# Patient Record
Sex: MALE | Race: WHITE | Employment: FULL TIME | URBAN - NONMETROPOLITAN AREA
[De-identification: names, ages, dates, MRNs, and addresses within clinical notes are randomized per-mention and may not be internally consistent; named-entity substitution may affect disease eponyms.]

---

## 2017-01-01 ENCOUNTER — HOSPITAL ENCOUNTER (OUTPATIENT)
Dept: PHYSICAL THERAPY | Age: 37
Setting detail: THERAPIES SERIES
Discharge: HOME | End: 2017-12-21
Payer: COMMERCIAL

## 2017-01-01 ENCOUNTER — HOSPITAL ENCOUNTER (OUTPATIENT)
Dept: PHYSICAL THERAPY | Age: 37
Setting detail: THERAPIES SERIES
End: 2017-01-01
Payer: COMMERCIAL

## 2017-01-01 ENCOUNTER — HOSPITAL ENCOUNTER (OUTPATIENT)
Dept: PHYSICAL THERAPY | Age: 37
Setting detail: THERAPIES SERIES
Discharge: HOME | End: 2017-12-12
Payer: COMMERCIAL

## 2017-01-01 ENCOUNTER — HOSPITAL ENCOUNTER (OUTPATIENT)
Dept: PHYSICAL THERAPY | Age: 37
Setting detail: THERAPIES SERIES
Discharge: HOME | End: 2017-12-27
Payer: COMMERCIAL

## 2017-01-01 ENCOUNTER — HOSPITAL ENCOUNTER (OUTPATIENT)
Dept: PHYSICAL THERAPY | Age: 37
Setting detail: THERAPIES SERIES
Discharge: HOME | End: 2017-12-08
Payer: COMMERCIAL

## 2017-01-01 PROCEDURE — 97140 MANUAL THERAPY 1/> REGIONS: CPT

## 2017-01-01 ASSESSMENT — PAIN SCALES - GENERAL: PAINLEVEL_OUTOF10: 3

## 2017-01-01 ASSESSMENT — PAIN DESCRIPTION - PAIN TYPE: TYPE: CHRONIC PAIN

## 2017-01-01 ASSESSMENT — PAIN DESCRIPTION - ORIENTATION: ORIENTATION: LEFT;OUTER

## 2017-01-01 ASSESSMENT — PAIN DESCRIPTION - DESCRIPTORS: DESCRIPTORS: THROBBING;ACHING

## 2017-01-01 ASSESSMENT — PAIN DESCRIPTION - LOCATION: LOCATION: NECK

## 2017-03-24 ENCOUNTER — OFFICE VISIT (OUTPATIENT)
Dept: FAMILY MEDICINE CLINIC | Age: 37
End: 2017-03-24

## 2017-03-24 VITALS
SYSTOLIC BLOOD PRESSURE: 164 MMHG | BODY MASS INDEX: 39.47 KG/M2 | TEMPERATURE: 99 F | DIASTOLIC BLOOD PRESSURE: 112 MMHG | HEART RATE: 104 BPM | HEIGHT: 74 IN | WEIGHT: 307.6 LBS

## 2017-03-24 DIAGNOSIS — K04.7 DENTAL INFECTION: Primary | ICD-10-CM

## 2017-03-24 PROCEDURE — 99212 OFFICE O/P EST SF 10 MIN: CPT | Performed by: FAMILY MEDICINE

## 2017-03-24 RX ORDER — COVID-19 ANTIGEN TEST
1 KIT MISCELLANEOUS 2 TIMES DAILY PRN
COMMUNITY

## 2017-03-24 RX ORDER — PENICILLIN V POTASSIUM 500 MG/1
500 TABLET ORAL 3 TIMES DAILY
Qty: 60 TABLET | Refills: 0 | Status: SHIPPED | OUTPATIENT
Start: 2017-03-24 | End: 2017-04-13

## 2017-07-07 ENCOUNTER — OFFICE VISIT (OUTPATIENT)
Dept: FAMILY MEDICINE CLINIC | Age: 37
End: 2017-07-07

## 2017-07-07 VITALS
WEIGHT: 264 LBS | BODY MASS INDEX: 33.88 KG/M2 | HEART RATE: 64 BPM | DIASTOLIC BLOOD PRESSURE: 72 MMHG | SYSTOLIC BLOOD PRESSURE: 110 MMHG | HEIGHT: 74 IN

## 2017-07-07 DIAGNOSIS — I10 ESSENTIAL HYPERTENSION: Primary | ICD-10-CM

## 2017-07-07 DIAGNOSIS — I10 ESSENTIAL HYPERTENSION: ICD-10-CM

## 2017-07-07 PROCEDURE — 99213 OFFICE O/P EST LOW 20 MIN: CPT | Performed by: FAMILY MEDICINE

## 2017-07-07 RX ORDER — OXYCODONE HYDROCHLORIDE AND ACETAMINOPHEN 325; 5 MG/5ML; MG/5ML
SOLUTION ORAL EVERY 4 HOURS PRN
COMMUNITY
End: 2018-01-01

## 2017-07-07 RX ORDER — METOPROLOL SUCCINATE 25 MG/1
25 TABLET, EXTENDED RELEASE ORAL 2 TIMES DAILY
Qty: 180 TABLET | Refills: 1 | Status: SHIPPED | OUTPATIENT
Start: 2017-07-07 | End: 2017-07-07

## 2017-07-07 RX ORDER — AMLODIPINE BESYLATE 10 MG/1
10 TABLET ORAL DAILY
COMMUNITY
End: 2017-07-07 | Stop reason: SDUPTHER

## 2017-07-07 RX ORDER — AMLODIPINE BESYLATE 10 MG/1
10 TABLET ORAL DAILY
Qty: 90 TABLET | Refills: 1 | Status: SHIPPED | OUTPATIENT
Start: 2017-07-07 | End: 2018-01-01 | Stop reason: SDUPTHER

## 2017-07-07 RX ORDER — METOPROLOL SUCCINATE 25 MG/1
25 TABLET, EXTENDED RELEASE ORAL 2 TIMES DAILY
COMMUNITY
End: 2017-07-07 | Stop reason: SDUPTHER

## 2017-07-07 RX ORDER — AMLODIPINE BESYLATE 10 MG/1
10 TABLET ORAL DAILY
Qty: 90 TABLET | Refills: 1 | Status: SHIPPED | OUTPATIENT
Start: 2017-07-07 | End: 2017-07-07 | Stop reason: SDUPTHER

## 2017-07-07 ASSESSMENT — ENCOUNTER SYMPTOMS
CHEST TIGHTNESS: 0
TROUBLE SWALLOWING: 1
SHORTNESS OF BREATH: 0

## 2017-10-30 ENCOUNTER — HOSPITAL ENCOUNTER (OUTPATIENT)
Dept: PHYSICAL THERAPY | Age: 37
Setting detail: THERAPIES SERIES
Discharge: HOME | End: 2017-10-30
Payer: COMMERCIAL

## 2017-10-30 PROCEDURE — 97140 MANUAL THERAPY 1/> REGIONS: CPT

## 2017-10-30 ASSESSMENT — PAIN DESCRIPTION - LOCATION: LOCATION: NECK

## 2017-10-30 ASSESSMENT — PAIN DESCRIPTION - DESCRIPTORS: DESCRIPTORS: TIGHTNESS

## 2017-10-30 ASSESSMENT — PAIN DESCRIPTION - PAIN TYPE: TYPE: CHRONIC PAIN

## 2017-10-30 ASSESSMENT — PAIN SCALES - GENERAL: PAINLEVEL_OUTOF10: 4

## 2017-10-30 NOTE — PROGRESS NOTES
Katarina Hidalgo 61  LYMPHEDEMA SERVICES  DAILY NOTE    Date: 10/30/2017  Patient Name: Leroy Harris        CSN: [id number]   : 1980  (29 y.o.)  Gender: male   Referring Practitioner: Vilma Daily MD ([address] [facility],)  Diagnosis: 1) Squamous Cell Cancer of Tongue  2) Lymphedema  Referral Date : 17    PT Visit Information  PT Insurance Information: [facility] ([address] [place] [address])  Total # of Visits to Date: 2  Plan of Care/Certification Expiration Date: 18  No Show: 0  Canceled Appointment: 0  Progress Note Counter: 2/10    Restrictions/Precautions  Fall risk, Universal precautions,Trach and PEG tube in place. Pain  Patient Currently in Pain: Yes (Patient denied pain in the face/neck region.)  Pain Assessment  Pain Assessment: 0-10  Pain Level: 4  Pain Type: Chronic pain  Pain Location: Neck  Pain Descriptors: Tightness    SUBJECTIVE     -10/30/2017: The patient presented to the Lymphedema clinic on this date for his initial treatment session. He denied any changes in his medical status since the initial evaluation. TREATMENT SESSION  Manual Lymph Drainage (Purpose: Increase Lymph Circulation/Decongestion of head and neck area). -POSITION: Supine with head of treatment table slightly elevated with knees supported in slightly flexed position on bolster. -FREQUENCY: 5-7 circles completed unless otherwise stated.   -SEQUENCING FOR NECK (POST-SURGICAL SWELLING)   -Effleurage (2-3x): From Sternum to Acromion (Bilaterally)   -\"Terminus\"-Supraclavicular Fossa (Manipulation of the Inferior Cervical Lymph Nodes (Stationary circles))   -\"Profundus\"-Manipulation of the Deep Lateral Cervical Lymph Nodes (Earlobe to Supraclavicular Fossa)   -Pre-Auricular LNN and Retro-Auricular LNN- (Stationary circles)   -Submandibular Lymph Nodes-Manipulation of the Superior Cervical Lymph Nodes (Chin to jaw angle)   -Shoulder Collectors (2 hand placement on the Acromion and Medial Shoulder)     -SEQUENCING FOR FACE (POST-SURGICAL SWELLING)   -Effleurage (2-3x): Along lower jaw, upper jaw, cheek, and the forehead in direction of the angle of the jaw. -Upper Jaw (Submandibular Lymph Nodes to the angle of the jaw)   -Bridge of nose (Bridge of nose to cheek)   -Cheek (Below Eyelids towards Submandibular Lymph Nodes then towards angle of Supraclavicular Fossa)   -Upper Eyelids and eyebrows (Working towards Preauricular Lymph Nodes)   -Forehead (Forehead and temple towards Preauricular Lymph Nodes)   -Top of head (Towards Preauricular Lymph Nodes)   -Effleurage  -REWORK    Skin Care Measures  Applied moisturizer - Type: Eucerin Moisturizing creme on lateral trap muscles (bilaterally). Compression Bandaging-No compression bandaging applied 10/30/2017     Decongestive/Therapeutic Exercise  The patient was able to complete Decongestive Therapy exercises (x 10 reps) including head/neck rotation R/L; fist clenches (x 10 reps); shoulder flexion Bilaterally with slight pressure applied on the left for stretching. Patient reported stretching/pulling sensation in the left upper arm region. The exercises were completed with compression bandages on, without complaints of pain from the patient. The Decongestive Therapy exercises assist with decreasing the swelling by increasing the muscle pumping action of the lymph collectors and by increasing the fluid uptake in the lymphatic system. Patient Education  New Education Provided:   -10/30/2017: Refer to Decongestive/Therapeutic Exercise; Proper deep breathing technique.   Learner:patient  Method: demonstration and explanation       Outcome: acknowledged understanding of goals, exercises, demonstrated understanding and asked questions     GOALS   SHORT-TERM GOALS:  to be met in 6 weeks   X  Patient will demonstrate a decrease in circumferential measurements of the affected extremity by 3-5 cm working towards the lymphedema swelling stabilizing and patient being able to get measured and fitted for a new compression garment to be worn daily to keep swelling down. X  Patient will demonstrate increased active range of motion by at least 10 degrees for head/neck working to increase safety during ambulation and future driving skills for looking at blind spot of the shoulder. X Patient/family/caregiver will demonstrate and verbalize 3-5 skin care precautionary measures to decrease dry skin and risk of infection. X Patient will demonstrate increased active shoulder/elbow/wrist range of motion by at least 20 degrees (left side) to be able to return to work and complete his daily duties safely. -- --      ASSESSMENT:  Assessment:  Patient progressing toward goal achievement. Patient may benefit from sequential compression pump for the trunk, neck and head to be used during home management skills. Activity Tolerance:  Patient tolerance of  treatment: Fair (Noted facial grimacing during MLD on the left side of the neck, decreased amount of pressure applied.)  Plan: Continue treatment according to established plan of care.          Time In: 1530   Time Out: 1625   Timed Code Minutes: 55   Untimed Code Minutes: 0   Total Treatment Time: [address], P.T. #6192, [facility]    10/30/2017

## 2017-11-02 ENCOUNTER — HOSPITAL ENCOUNTER (OUTPATIENT)
Dept: PHYSICAL THERAPY | Age: 37
Setting detail: THERAPIES SERIES
Discharge: HOME | End: 2017-11-02
Payer: COMMERCIAL

## 2017-11-02 PROCEDURE — 97140 MANUAL THERAPY 1/> REGIONS: CPT

## 2017-11-02 NOTE — PROGRESS NOTES
LNN and Retro-Auricular LNN- (Stationary circles)   -Submandibular Lymph Nodes-Manipulation of the Superior Cervical Lymph Nodes (Chin to jaw angle)   -Shoulder Collectors (2 hand placement on the Acromion and Medial Shoulder)     -SEQUENCING FOR FACE (POST-SURGICAL SWELLING)   -Effleurage (2-3x): Along lower jaw, upper jaw, cheek, and the forehead in direction of the angle of the jaw. -Upper Jaw (Submandibular Lymph Nodes to the angle of the jaw)   -Bridge of nose (Bridge of nose to cheek)   -Cheek (Below Eyelids towards Submandibular Lymph Nodes then towards angle of Supraclavicular Fossa)   -Upper Eyelids and eyebrows (Working towards Preauricular Lymph Nodes)   -Forehead (Forehead and temple towards Preauricular Lymph Nodes)   -Top of head (Towards Preauricular Lymph Nodes)   -Effleurage  -REWORK  Fibrotic technique: Posterior neck and upper trapezius region. Skin Care Measures  Applied moisturizer - Type: Eucerin Moisturizing creme on lateral trap muscles (bilaterally). Compression Bandaging-No compression bandaging applied 11/2/2017     Decongestive/Therapeutic Exercise  The patient was able to complete Decongestive Therapy exercises (x 10 reps) including head/neck rotation R/L; fist clenches (x 10 reps); shoulder flexion Bilaterally with slight pressure applied on the left for stretching; (Seated) shoulder scapular retraction with 5 second holds with and without dowel jenna, shoulder flexion with limitations due to \"tight\" sensation and discomfort in the left upper extremity per patient; shoulder shrugs up with backwards rotation; and proper body mechanics with increased trunk extension with placement of lumbar roll in mid/lower back (with the trach and PEG tube placement, the patient tends to sit with a forward flexed position). Patient reported stretching/pulling sensation in the left upper arm region.  The exercises were completed with compression bandages on, without complaints of pain from the

## 2017-11-07 ENCOUNTER — HOSPITAL ENCOUNTER (OUTPATIENT)
Dept: PHYSICAL THERAPY | Age: 37
Setting detail: THERAPIES SERIES
Discharge: HOME | End: 2017-11-07
Payer: COMMERCIAL

## 2017-11-07 PROCEDURE — 97140 MANUAL THERAPY 1/> REGIONS: CPT

## 2017-11-07 NOTE — PROGRESS NOTES
Katarina Hidalgo 61  LYMPHEDEMA SERVICES  DAILY NOTE    Date: 2017  Patient Name: Nancy Angelucci        CSN: [id number]   : 1980  (29 y.o.)  Gender: male   Referring Practitioner: Gibson Meckel, MD ([address] [facility] [address],)  Diagnosis: 1) Squamous Cell Cancer of Tongue  2) Lymphedema  Referral Date : 17    PT Visit Information  PT Insurance Information: [facility] ([address] [place] [address])  Total # of Visits to Date: 4  Plan of Care/Certification Expiration Date: 18  No Show: 0  Canceled Appointment: 0  Progress Note Counter: 4/10    Restrictions/Precautions  Fall risk, Universal precautions,Trach and PEG tube in place. Pain  Patient Currently in Pain: Denies (Patient denied pain in the face/neck region, he reported numbness on the lateral aspect of the chin/neck region.)    SUBJECTIVE     -2017: The patient presented to the Lymphedema clinic on this date with no new complaints since the last treatment session. He reported that he feels as if the swelling has gone down in his face and neck region, \"I've noticed that my speech is a little better\" per patient. The therapist agreed and noted that the patient's speech was more clear less garbled. -2017: The patient presented to the Lymphedema clinic on this date with reports of numbness on the jaw/chin/neck region (left side). \"It's been like this since the surgery, so it's nothing new. \" per patient. He denied pain, however, the therapist noted facial grimacing during MLD on the left side of the lower face and neck.   -10/30/2017: The patient presented to the Lymphedema clinic on this date for his initial treatment session. He denied any changes in his medical status since the initial evaluation. TREATMENT SESSION  Manual Lymph Drainage (Purpose: Increase Lymph Circulation/Decongestion of head and neck area).   -POSITION: Supine with head of treatment table slightly elevated with knees supported in slightly flexed position on bolster. -FREQUENCY: 5-7 circles completed unless otherwise stated. -SEQUENCING FOR NECK (POST-SURGICAL SWELLING)   -Effleurage (2-3x): From Sternum to Acromion (Bilaterally)   -\"Terminus\"-Supraclavicular Fossa (Manipulation of the Inferior Cervical Lymph Nodes (Stationary circles))   -\"Profundus\"-Manipulation of the Deep Lateral Cervical Lymph Nodes (Earlobe to Supraclavicular Fossa)   -Pre-Auricular LNN and Retro-Auricular LNN- (Stationary circles)   -Submandibular Lymph Nodes-Manipulation of the Superior Cervical Lymph Nodes (Chin to jaw angle)   -Shoulder Collectors (2 hand placement on the Acromion and Medial Shoulder)     -SEQUENCING FOR FACE (POST-SURGICAL SWELLING)   -Effleurage (2-3x): Along lower jaw, upper jaw, cheek, and the forehead in direction of the angle of the jaw. -Upper Jaw (Submandibular Lymph Nodes to the angle of the jaw)   -Bridge of nose (Bridge of nose to cheek)   -Cheek (Below Eyelids towards Submandibular Lymph Nodes then towards angle of Supraclavicular Fossa)   -Upper Eyelids and eyebrows (Working towards Preauricular Lymph Nodes)   -Forehead (Forehead and temple towards Preauricular Lymph Nodes)   -Top of head (Towards Preauricular Lymph Nodes)   -Effleurage  -REWORK  Fibrotic technique: Posterior neck and upper trapezius region (Seated position with bilateral upper extremities supported on tray table with pillow and elevated slightly to shoulder height. Skin Care Measures  Applied moisturizer - Type: Eucerin Moisturizing creme on lateral trap muscles (bilaterally). Compression Bandaging-No compression bandaging applied 11/7/2017. Measurements taken for fabrication of neck/chin padding. Decongestive/Therapeutic Exercise  The patient was able to complete Decongestive Therapy exercises (x 10 reps) including facial exercises (Exaggerated vowels for neck stretching) x 5 reps.  The exercises were completed with compression bandages on, without achievement. Patient may benefit from sequential compression pump for the trunk, neck and head to be used during home management skills. The patient may also benefit from additional therapy to include Occupational Therapy to assist with left upper extremity range of motion and strengthening. Activity Tolerance:  Patient tolerance of  treatment: Fair (Noted facial grimacing during MLD on the left side of the neck, decreased amount of pressure applied. However, the patient denied having pain during the MLD and Fibrotic Technique.)  Plan: Week of November 6 (3x).          Time In: 1510   Time Out: 1615   Timed Code Minutes: 65   Untimed Code Minutes: 0   Total Treatment Time: [address], P[address] #1509, [facility]    11/7/2017

## 2017-11-09 ENCOUNTER — HOSPITAL ENCOUNTER (OUTPATIENT)
Dept: PHYSICAL THERAPY | Age: 37
Setting detail: THERAPIES SERIES
Discharge: HOME | End: 2017-11-09
Payer: COMMERCIAL

## 2017-11-09 PROCEDURE — 97140 MANUAL THERAPY 1/> REGIONS: CPT

## 2017-11-09 NOTE — PROGRESS NOTES
Katarina Hidalgo 61  LYMPHEDEMA SERVICES  DAILY NOTE    Date: 2017  Patient Name: Radha Arvizu        CSN: [id number]   : 1980  (29 y.o.)  Gender: male   Referring Practitioner: Samuel Sanches MD ([address],)  Diagnosis: 1) Squamous Cell Cancer of Tongue  2) Lymphedema  Referral Date : 17    PT Visit Information  PT Insurance Information: [facility] ([address] [place] [address])  Total # of Visits to Date: 5  Plan of Care/Certification Expiration Date: 18  No Show: 0  Canceled Appointment: 0  Progress Note Counter: 5/10    Restrictions/Precautions  Fall risk, Universal precautions,Trach and PEG tube in place. Pain  Patient Currently in Pain: Denies (Patient denied pain in the face/neck region, he reported numbness on the lateral aspect of the chin/neck region.)    SUBJECTIVE     -2017: The patient presented to the Lymphedema clinic no this date with no new complaints. Upon observation noted a slight increase in swelling in the neck region (right > left) in comparison to the last treatment session. -2017: The patient presented to the Lymphedema clinic on this date with no new complaints since the last treatment session. He reported that he feels as if the swelling has gone down in his face and neck region, \"I've noticed that my speech is a little better\" per patient. The therapist agreed and noted that the patient's speech was more clear less garbled. -2017: The patient presented to the Lymphedema clinic on this date with reports of numbness on the jaw/chin/neck region (left side). \"It's been like this since the surgery, so it's nothing new. \" per patient. He denied pain, however, the therapist noted facial grimacing during MLD on the left side of the lower face and neck.   -10/30/2017: The patient presented to the Lymphedema clinic on this date for his initial treatment session.  He denied any changes in his medical status since for head/neck working to increase safety during ambulation and future driving skills for looking at blind spot of the shoulder. X Patient/family/caregiver will demonstrate and verbalize 3-5 skin care precautionary measures to decrease dry skin and risk of infection. X Patient will demonstrate increased active shoulder/elbow/wrist range of motion by at least 20 degrees (left side) to be able to return to work and complete his daily duties safely. -- --      ASSESSMENT:  Assessment:  Patient progressing toward goal achievement. Patient may benefit from sequential compression pump for the trunk, neck and head to be used during home management skills. The patient may also benefit from additional therapy to include Occupational Therapy to assist with left upper extremity range of motion and strengthening. Activity Tolerance:  Patient tolerance of  treatment: Fair (Noted facial grimacing during MLD on the left side of the neck, decreased amount of pressure applied. However, the patient denied having pain during the MLD and Fibrotic Technique.)  Plan: Week of November 6 (3x).          Time In: 1500   Time Out: 1606   Timed Code Minutes: 66   Untimed Code Minutes: 0   Total Treatment Time: [address] [facility], P.T. #8803, [facility]    11/9/2017

## 2017-11-10 ENCOUNTER — HOSPITAL ENCOUNTER (OUTPATIENT)
Dept: PHYSICAL THERAPY | Age: 37
Setting detail: THERAPIES SERIES
Discharge: HOME | End: 2017-11-10
Payer: COMMERCIAL

## 2017-11-10 PROCEDURE — 97140 MANUAL THERAPY 1/> REGIONS: CPT

## 2017-11-12 ASSESSMENT — PAIN SCALES - GENERAL: PAINLEVEL_OUTOF10: 4

## 2017-11-12 ASSESSMENT — PAIN DESCRIPTION - ORIENTATION: ORIENTATION: RIGHT

## 2017-11-12 ASSESSMENT — PAIN DESCRIPTION - LOCATION: LOCATION: NECK

## 2017-11-12 ASSESSMENT — PAIN DESCRIPTION - PAIN TYPE: TYPE: CHRONIC PAIN

## 2017-11-12 ASSESSMENT — PAIN DESCRIPTION - DESCRIPTORS: DESCRIPTORS: NUMBNESS

## 2017-11-12 NOTE — PROGRESS NOTES
Katarina Hidalgo 61  LYMPHEDEMA SERVICES  DAILY NOTE    Date: 11/10/2017  Patient Name: Leticia Titus        CSN: [id number]   : 1980  (29 y.o.)  Gender: male   Referring Practitioner: Newton Ge MD ([address] [facility] [address],)  Diagnosis: 1) Squamous Cell Cancer of Tongue  2) Lymphedema  Referral Date : 17    PT Visit Information  PT Insurance Information: [facility] ([address] [place] [address])  Total # of Visits to Date: 6  Plan of Care/Certification Expiration Date: 18  No Show: 0  Canceled Appointment: 0  Progress Note Counter: 6/10    Restrictions/Precautions  Fall risk, Universal precautions,Trach and PEG tube in place. Pain  Patient Currently in Pain: Denies (Patient denied pain in the face/neck region, he reported numbness on the lateral aspect of the chin/neck region.)  Pain Assessment  Pain Assessment: 0-10  Pain Level: 4  Pain Type: Chronic pain  Pain Location: Neck  Pain Orientation: Right  Pain Descriptors: Numbness    SUBJECTIVE     -11/10/2017: The patient presented to the Lymphedema clinic on this date with reports of less fullness in the neck region in compared to yesterday. \"I have been wearing that stockinette 30 minutes at a time and it feels pretty good\" per patient. -2017: The patient presented to the Lymphedema clinic no this date with no new complaints. Upon observation noted a slight increase in swelling in the neck region (right > left) in comparison to the last treatment session. -2017: The patient presented to the Lymphedema clinic on this date with no new complaints since the last treatment session. He reported that he feels as if the swelling has gone down in his face and neck region, \"I've noticed that my speech is a little better\" per patient. The therapist agreed and noted that the patient's speech was more clear less garbled.   -2017: The patient presented to the Lymphedema clinic on this date with reports of numbness on the jaw/chin/neck region (left side). \"It's been like this since the surgery, so it's nothing new. \" per patient. He denied pain, however, the therapist noted facial grimacing during MLD on the left side of the lower face and neck.   -10/30/2017: The patient presented to the Lymphedema clinic on this date for his initial treatment session. He denied any changes in his medical status since the initial evaluation. TREATMENT SESSION  Manual Lymph Drainage (Purpose: Increase Lymph Circulation/Decongestion of head and neck area). -POSITION: Supine with head of treatment table slightly elevated with knees supported in slightly flexed position on bolster. -FREQUENCY: 5-7 circles completed unless otherwise stated. -SEQUENCING FOR NECK (POST-SURGICAL SWELLING)   -Effleurage (2-3x): From Sternum to Acromion (Bilaterally)   -\"Terminus\"-Supraclavicular Fossa (Manipulation of the Inferior Cervical Lymph Nodes (Stationary circles))   -\"Profundus\"-Manipulation of the Deep Lateral Cervical Lymph Nodes (Earlobe to Supraclavicular Fossa)   -Pre-Auricular LNN and Retro-Auricular LNN- (Stationary circles)   -Submandibular Lymph Nodes-Manipulation of the Superior Cervical Lymph Nodes (Chin to jaw angle)   -Shoulder Collectors (2 hand placement on the Acromion and Medial Shoulder)     -SEQUENCING FOR FACE (POST-SURGICAL SWELLING)   -Effleurage (2-3x): Along lower jaw, upper jaw, cheek, and the forehead in direction of the angle of the jaw.    -Upper Jaw (Submandibular Lymph Nodes to the angle of the jaw)   -Bridge of nose (Bridge of nose to cheek)   -Cheek (Below Eyelids towards Submandibular Lymph Nodes then towards angle of Supraclavicular Fossa)   -Upper Eyelids and eyebrows (Working towards Preauricular Lymph Nodes)   -Forehead (Forehead and temple towards Preauricular Lymph Nodes)   -Top of head (Towards Preauricular Lymph Nodes)   -Effleurage  -REWORK  Fibrotic technique: Posterior neck and upper trapezius region (Seated position with bilateral upper extremities supported on tray table with pillow and elevated slightly to shoulder height. Skin Care Measures  Applied moisturizer - Type: Eucerin Moisturizing creme on lateral trap muscles (bilaterally). Compression Bandaging-Fabricated and provided the patient with head and chin strap using Tubigrip stockinette (size H) to be worn initially 30 minutes on, 30 minutes off, working his way up to 1 hour on until the next treatment session tomorrow. Patient verbalized a good understanding and was able to kurt with minimal assistance. Decongestive/Therapeutic Exercise  The patient was able to complete Decongestive Therapy exercises (x 10 reps) including x 5 reps shoulder shrugs; dowel jenna shoulder flexion with light pressure applied at end range by the therapist with 5 second hold (patient reporting tightness/stretching in the left upper arm); chest presses; horizontal shoulder abduction with slight resistance applied with left elbow extended to at least 30 degrees lacking full extension. The exercises were completed with compression bandages on, without complaints of pain from the patient. The Decongestive Therapy exercises assist with decreasing the swelling by increasing the muscle pumping action of the lymph collectors and by increasing the fluid uptake in the lymphatic system. Patient Education  New Education Provided:   -11/10/2017: Refer to exercises above. -11/07/2017: Reviewed plan of care for exercises and discussed purpose for fabrication of padding for slight compression to create a micromassaging effect to the tissues to decrease swelling. -11/02/2017: Refer to Decongestive/Therapeutic Exercise above, with the introduction of shoulder range of motion and postural awareness exercises and instructed the patient on the use of the lumbar roll.  Patient was in agreement and verbalized a good understanding.   -10/30/2017: Refer to Decongestive/Therapeutic Exercise; Proper deep breathing technique. Learner:patient  Method: demonstration and explanation       Outcome: acknowledged understanding of goals, exercises, demonstrated understanding and asked questions     GOALS   SHORT-TERM GOALS:  to be met in 6 weeks   X  Patient will demonstrate a decrease in circumferential measurements of the affected extremity by 3-5 cm working towards the lymphedema swelling stabilizing and patient being able to get measured and fitted for a new compression garment to be worn daily to keep swelling down. X  Patient will demonstrate increased active range of motion by at least 10 degrees for head/neck working to increase safety during ambulation and future driving skills for looking at blind spot of the shoulder. X Patient/family/caregiver will demonstrate and verbalize 3-5 skin care precautionary measures to decrease dry skin and risk of infection. X Patient will demonstrate increased active shoulder/elbow/wrist range of motion by at least 20 degrees (left side) to be able to return to work and complete his daily duties safely. -- --      ASSESSMENT:  Assessment:  Patient progressing toward goal achievement. Patient may benefit from sequential compression pump for the trunk, neck and head to be used during home management skills. The patient may also benefit from additional therapy to include Occupational Therapy to assist with left upper extremity range of motion and strengthening. Activity Tolerance:  Patient tolerance of  treatment: Fair (Noted facial grimacing during MLD on the left side of the neck, decreased amount of pressure applied. However, the patient denied having pain during the MLD and Fibrotic Technique.)  Plan: Week of November 13 (3x).        Time In: 1500   Time Out: 1555   Timed Code Minutes: 55   Untimed Code Minutes: 0   Total Treatment Time: [address], P.T. #0358, [facility]    11/10/2017

## 2017-11-14 ENCOUNTER — HOSPITAL ENCOUNTER (OUTPATIENT)
Dept: PHYSICAL THERAPY | Age: 37
Setting detail: THERAPIES SERIES
Discharge: HOME | End: 2017-11-14
Payer: COMMERCIAL

## 2017-11-14 PROCEDURE — 97140 MANUAL THERAPY 1/> REGIONS: CPT

## 2017-11-17 ENCOUNTER — HOSPITAL ENCOUNTER (OUTPATIENT)
Dept: PHYSICAL THERAPY | Age: 37
Setting detail: THERAPIES SERIES
Discharge: HOME | End: 2017-11-17
Payer: COMMERCIAL

## 2017-11-17 PROCEDURE — 97140 MANUAL THERAPY 1/> REGIONS: CPT

## 2017-11-17 NOTE — PROGRESS NOTES
[facility] [address]  LYMPHEDEMA SERVICES  DAILY NOTE    Date: 2017   Patient Name: Lety Moeller        CSN: [id number]   : 1980  (29 y.o.)  Gender: male   Referring Practitioner: Tejinder Andrade MD ([address])  Diagnosis: 1) Squamous Cell Cancer of Tongue  2) Lymphedema  Referral Date : 17    PT Visit Information  PT Insurance Information: [facility] ([address] [place] [address])  Total # of Visits to Date: 7  Plan of Care/Certification Expiration Date: 18  No Show: 0  Canceled Appointment: 0  Progress Note Counter: 7/10    Restrictions/Precautions  Fall risk, Universal precautions,Trach and PEG tube in place. Pain  Patient Currently in Pain: Denies (Patient denied pain in the face/neck region, he reported numbness on the lateral aspect of the chin/neck region.)    SUBJECTIVE     -2017: The patient presented to the Lymphedema clinic with less swelling noted in the neck region. -11/10/2017: The patient presented to the Lymphedema clinic on this date with reports of less fullness in the neck region in compared to yesterday. \"I have been wearing that stockinette 30 minutes at a time and it feels pretty good\" per patient. -2017: The patient presented to the Lymphedema clinic no this date with no new complaints. Upon observation noted a slight increase in swelling in the neck region (right > left) in comparison to the last treatment session. -2017: The patient presented to the Lymphedema clinic on this date with no new complaints since the last treatment session. He reported that he feels as if the swelling has gone down in his face and neck region, \"I've noticed that my speech is a little better\" per patient. The therapist agreed and noted that the patient's speech was more clear less garbled.   -2017: The patient presented to the Lymphedema clinic on this date with reports of numbness on the jaw/chin/neck region (left side). \"It's been like this since the surgery, so it's nothing new. \" per patient. He denied pain, however, the therapist noted facial grimacing during MLD on the left side of the lower face and neck.   -10/30/2017: The patient presented to the Lymphedema clinic on this date for his initial treatment session. He denied any changes in his medical status since the initial evaluation. TREATMENT SESSION  Manual Lymph Drainage (Purpose: Increase Lymph Circulation/Decongestion of head and neck area). -POSITION: Supine with head of treatment table slightly elevated with knees supported in slightly flexed position on bolster. -FREQUENCY: 5-7 circles completed unless otherwise stated. -SEQUENCING FOR NECK (POST-SURGICAL SWELLING)   -Effleurage (2-3x): From Sternum to Acromion (Bilaterally)   -\"Terminus\"-Supraclavicular Fossa (Manipulation of the Inferior Cervical Lymph Nodes (Stationary circles))   -\"Profundus\"-Manipulation of the Deep Lateral Cervical Lymph Nodes (Earlobe to Supraclavicular Fossa)   -Pre-Auricular LNN and Retro-Auricular LNN- (Stationary circles)   -Submandibular Lymph Nodes-Manipulation of the Superior Cervical Lymph Nodes (Chin to jaw angle)   -Shoulder Collectors (2 hand placement on the Acromion and Medial Shoulder)     -SEQUENCING FOR FACE (POST-SURGICAL SWELLING)   -Effleurage (2-3x): Along lower jaw, upper jaw, cheek, and the forehead in direction of the angle of the jaw.    -Upper Jaw (Submandibular Lymph Nodes to the angle of the jaw)   -Bridge of nose (Bridge of nose to cheek)   -Cheek (Below Eyelids towards Submandibular Lymph Nodes then towards angle of Supraclavicular Fossa)   -Upper Eyelids and eyebrows (Working towards Preauricular Lymph Nodes)   -Forehead (Forehead and temple towards Preauricular Lymph Nodes)   -Top of head (Towards Preauricular Lymph Nodes)   -Effleurage  -REWORK  Fibrotic technique: Posterior neck and upper trapezius region (Seated position with bilateral upper

## 2017-11-27 ENCOUNTER — HOSPITAL ENCOUNTER (OUTPATIENT)
Dept: PHYSICAL THERAPY | Age: 37
Setting detail: THERAPIES SERIES
Discharge: HOME | End: 2017-11-27
Payer: COMMERCIAL

## 2017-11-27 PROCEDURE — 97140 MANUAL THERAPY 1/> REGIONS: CPT

## 2017-11-27 NOTE — PROGRESS NOTES
[facility] [address]  LYMPHEDEMA SERVICES  DAILY NOTE    Date: 2017  Patient Name: Erik Edmonds        CSN: [id number]   : 1980  (29 y.o.)  Gender: male   Referring Practitioner: Kurt Joya MD ([address])  Diagnosis: 1) Squamous Cell Cancer of Tongue  2) Lymphedema  Referral Date : 17    PT Visit Information  PT Insurance Information: [facility] ([address] [place] [address])  Total # of Visits to Date: 7  Plan of Care/Certification Expiration Date: 18  No Show: 0  Canceled Appointment: 0  Progress Note Counter: 7/10    Restrictions/Precautions  Fall risk, Universal precautions,Trach and PEG tube in place. Pain  Patient Currently in Pain: Denies (Patient denied pain in the face/neck region, he reported numbness on the lateral aspect of the chin/neck region.)    SUBJECTIVE     -2017: The patient presented to the Lymphedema clinic on this date with no new reports of discomfort or changes in medical status. -11/10/2017: The patient presented to the Lymphedema clinic on this date with reports of less fullness in the neck region in compared to yesterday. \"I have been wearing that stockinette 30 minutes at a time and it feels pretty good\" per patient. -2017: The patient presented to the Lymphedema clinic no this date with no new complaints. Upon observation noted a slight increase in swelling in the neck region (right > left) in comparison to the last treatment session. -2017: The patient presented to the Lymphedema clinic on this date with no new complaints since the last treatment session. He reported that he feels as if the swelling has gone down in his face and neck region, \"I've noticed that my speech is a little better\" per patient. The therapist agreed and noted that the patient's speech was more clear less garbled.   -2017: The patient presented to the Lymphedema clinic on this date with reports of numbness on the measurements of the affected extremity by 3-5 cm working towards the lymphedema swelling stabilizing and patient being able to get measured and fitted for a new compression garment to be worn daily to keep swelling down. X  Patient will demonstrate increased active range of motion by at least 10 degrees for head/neck working to increase safety during ambulation and future driving skills for looking at blind spot of the shoulder. X Patient/family/caregiver will demonstrate and verbalize 3-5 skin care precautionary measures to decrease dry skin and risk of infection. X Patient will demonstrate increased active shoulder/elbow/wrist range of motion by at least 20 degrees (left side) to be able to return to work and complete his daily duties safely. -- --      ASSESSMENT:  Assessment:  Patient progressing toward goal achievement. Patient may benefit from sequential compression pump for the trunk, neck and head to be used during home management skills. The patient may also benefit from additional therapy to include Occupational Therapy to assist with left upper extremity range of motion and strengthening. Activity Tolerance:  Patient tolerance of  treatment: Fair (Noted facial grimacing during MLD on the left side of the neck, decreased amount of pressure applied. However, the patient denied having pain during the MLD and Fibrotic Technique.)  Plan: Week of November 13 (3x).        Time In: 1000   Time Out: 1100   Timed Code Minutes: 60   Untimed Code Minutes: 0   Total Treatment Time: [address]keon P.T. #[id number], [facility]    11/14/2017

## 2017-11-27 NOTE — PROGRESS NOTES
[facility] [address]  LYMPHEDEMA SERVICES  DAILY NOTE    Date: 2017   Patient Name: Mario Chin        CSN: [id number]   : 1980  (29 y.o.)  Gender: male   Referring Practitioner: Ayan Ho MD ([address])  Diagnosis: 1) Squamous Cell Cancer of Tongue  2) Lymphedema  Referral Date : 17    PT Visit Information  PT Insurance Information: [facility] ([address] [place] [address])  Total # of Visits to Date: 8  Plan of Care/Certification Expiration Date: 18  No Show: 0  Canceled Appointment: 0  Progress Note Counter: 8/10    Restrictions/Precautions  Fall risk, Universal precautions,Trach and PEG tube in place. Pain  Patient Currently in Pain: Denies (Patient denied pain in the face/neck region, he reported numbness on the lateral aspect of the chin/neck region.)    SUBJECTIVE     -2017: The patient presented to the Lymphedema clinic on this date with with trach removed from last week on Wednesday. \"It didn't hurt or anything and the doctor said that I don't have any restrictions for range of motion\" per patient. The patient also reported a clear scan last week. He has additional testing and follow up appointments tomorrow in [facility].  -2017: The patient presented to the Lymphedema clinic with less swelling noted in the neck region. -11/10/2017: The patient presented to the Lymphedema clinic on this date with reports of less fullness in the neck region in compared to yesterday. \"I have been wearing that stockinette 30 minutes at a time and it feels pretty good\" per patient. -2017: The patient presented to the Lymphedema clinic no this date with no new complaints. Upon observation noted a slight increase in swelling in the neck region (right > left) in comparison to the last treatment session.     TREATMENT SESSION  Manual Lymph Drainage (Purpose: Increase Lymph Circulation/Decongestion of head and neck exercises were completed with compression bandages on, without complaints of pain from the patient. The Decongestive Therapy exercises assist with decreasing the swelling by increasing the muscle pumping action of the lymph collectors and by increasing the fluid uptake in the lymphatic system. Patient Education  Education Provided:   -11/27/2017: Reviewed goals, continue to recommend Occupational therapy for improved range of motion and strength for the left upper extremity. -11/17/2017: Reviewed plan of care. -11/10/2017: Refer to exercises above. -11/07/2017: Reviewed plan of care for exercises and discussed purpose for fabrication of padding for slight compression to create a micromassaging effect to the tissues to decrease swelling. -11/02/2017: Refer to Decongestive/Therapeutic Exercise above, with the introduction of shoulder range of motion and postural awareness exercises and instructed the patient on the use of the lumbar roll. Patient was in agreement and verbalized a good understanding.   -10/30/2017: Refer to Decongestive/Therapeutic Exercise; Proper deep breathing technique. Learner:patient  Method: demonstration and explanation       Outcome: acknowledged understanding of goals, exercises, demonstrated understanding and asked questions     GOALS   SHORT-TERM GOALS:  to be met in 6 weeks   X  Patient will demonstrate a decrease in circumferential measurements of the affected extremity by 3-5 cm working towards the lymphedema swelling stabilizing and patient being able to get measured and fitted for a new compression garment to be worn daily to keep swelling down. X  Patient will demonstrate increased active range of motion by at least 10 degrees for head/neck working to increase safety during ambulation and future driving skills for looking at blind spot of the shoulder.    X Patient/family/caregiver will demonstrate and verbalize 3-5 skin care precautionary measures to decrease dry skin and risk of infection. X Patient will demonstrate increased active shoulder/elbow/wrist range of motion by at least 20 degrees (left side) to be able to return to work and complete his daily duties safely. -- --      ASSESSMENT:  Assessment:  Patient progressing toward goal achievement. Patient may benefit from sequential compression pump for the trunk, neck and head to be used during home management skills. The patient may also benefit from additional therapy to include Occupational Therapy to assist with left upper extremity range of motion and strengthening. Activity Tolerance:  Patient tolerance of  treatment: Fair (+) The patient denied having pain during the MLD and Fibrotic Technique.)  Plan: Week of November 27 (2x).        Time In: 1515   Time Out: 1610   Timed Code Minutes: 55   Untimed Code Minutes: 0   Total Treatment Time: Ben Luo, P.T. #[id number], [facility]   11/27/2017

## 2017-11-30 ENCOUNTER — HOSPITAL ENCOUNTER (OUTPATIENT)
Dept: PHYSICAL THERAPY | Age: 37
Setting detail: THERAPIES SERIES
Discharge: HOME | End: 2017-11-30
Payer: COMMERCIAL

## 2017-11-30 PROCEDURE — 97140 MANUAL THERAPY 1/> REGIONS: CPT

## 2017-12-10 NOTE — PROGRESS NOTES
[facility] [address]  LYMPHEDEMA SERVICES  DAILY NOTE     Date: 2017   Patient Name: Sabrina Hartley        CSN: [id number]   : 1980  (40 y.o.)  Gender: male   Referring Practitioner: DR. Ti Mcduffie MD ([address] [facility],)  Diagnosis: 1) Squamous Cell Cancer of Tongue  2) Lymphedema  Referral Date : 17    PT Visit Information  PT Insurance Information: [facility] ([address] [place] [address])  Total # of Visits to Date: 6  Plan of Care/Certification Expiration Date: 18  No Show: 0  Canceled Appointment: 0  Progress Note Counter:     Restrictions/Precautions  Fall risk, Universal precautions,Trach and PEG tube in place. Pain  Patient Currently in Pain: Denies (Patient denied pain in the face/neck region, he reported numbness on the lateral aspect of the chin/neck region.)    SUBJECTIVE     -2017: The patient presented to the Lymphedema clinic with the Trach tube removed from his neck. \"Everything went well at my hospital visit in [place], they said they didn't see anymore cancer\" per patient. The patient reported that he is able to move better and easier with the trach tube removed. \"I'm even allowed to drive now\" per patient. -2017: The patient is a very pleasant and motivated 29year old male who has undergone Complete Decongestive Therapy/Manual Lymph Drainage (CDT/MLD) treatment sessions due to having developed moderate neck/upper truncal and facial Lymphedema swelling following undergoing surgery, chemotherapy and radiation therapy due to being diagnosed with Squamous Cell Carcinoma or the Oral Cavity in May 2017. The patient reported that since beginning the Complete Decongestive Therapy/Manual Lymph Drainage (CDT/MLD) treatments, he has noticed a decrease in the amount of swelling in his face and neck. \"I can definitely tell a difference, it doesn't feel as full\" per patient.      TREATMENT SESSION  Manual Lymph Drainage (Purpose: Increase Lymph Circulation/Decongestion of head and neck area). -POSITION: Supine with head of treatment table slightly elevated with knees supported in slightly flexed position on bolster. -FREQUENCY: 5-7 circles completed unless otherwise stated. -SEQUENCING FOR NECK (POST-SURGICAL SWELLING)   -Effleurage (2-3x): From Sternum to Acromion (Bilaterally)   -\"Terminus\"-Supraclavicular Fossa (Manipulation of the Inferior Cervical Lymph Nodes (Stationary circles))   -\"Profundus\"-Manipulation of the Deep Lateral Cervical Lymph Nodes (Earlobe to Supraclavicular Fossa)   -Pre-Auricular LNN and Retro-Auricular LNN- (Stationary circles)   -Submandibular Lymph Nodes-Manipulation of the Superior Cervical Lymph Nodes (Chin to jaw angle)   -Shoulder Collectors (2 hand placement on the Acromion and Medial Shoulder)     -SEQUENCING FOR FACE (POST-SURGICAL SWELLING)   -Effleurage (2-3x): Along lower jaw, upper jaw, cheek, and the forehead in direction of the angle of the jaw. -Upper Jaw (Submandibular Lymph Nodes to the angle of the jaw)   -Bridge of nose (Bridge of nose to cheek)   -Cheek (Below Eyelids towards Submandibular Lymph Nodes then towards angle of Supraclavicular Fossa)   -Upper Eyelids and eyebrows (Working towards Preauricular Lymph Nodes)   -Forehead (Forehead and temple towards Preauricular Lymph Nodes)   -Top of head (Towards Preauricular Lymph Nodes)   -Effleurage  -REWORK  Fibrotic technique: Posterior neck and upper trapezius region (Supine position with bilateral upper extremities supported on tray table with pillow and elevated slightly to shoulder height. Skin Care Measures  Applied moisturizer - Type: Eucerin Moisturizing creme on lateral trap muscles (bilaterally).     Decongestive/Therapeutic Exercise  The patient was able to complete Decongestive Therapy exercises (x 10 reps) dowel jenna shoulder flexion with light pressure applied at end range by the therapist with 5 second hold (patient reporting tightness/stretching in the left upper arm). The exercises were completed with compression bandages on, without complaints of pain from the patient. The Decongestive Therapy exercises assist with decreasing the swelling by increasing the muscle pumping action of the lymph collectors and by increasing the fluid uptake in the lymphatic system. Patient Education  Education Provided:  -11/30/2017: Reviewed goals and plan of care. Received signed referral from the patient's referral practitioner for Occupational Therapy evaluation and treat. Provided the patient with a copy of the referral and instructed the patient to contact OT for evaluation. Patient was in agreement. -11/27/2017: Reviewed goals, continue to recommend Occupational therapy for improved range of motion and strength for the left upper extremity. -11/17/2017: Reviewed plan of care. -11/10/2017: Refer to exercises above. -11/07/2017: Reviewed plan of care for exercises and discussed purpose for fabrication of padding for slight compression to create a micromassaging effect to the tissues to decrease swelling. -11/02/2017: Refer to Decongestive/Therapeutic Exercise above, with the introduction of shoulder range of motion and postural awareness exercises and instructed the patient on the use of the lumbar roll. Patient was in agreement and verbalized a good understanding.   -10/30/2017: Refer to Decongestive/Therapeutic Exercise; Proper deep breathing technique.   Learner:patient  Method: demonstration and explanation       Outcome: acknowledged understanding of goals, exercises, demonstrated understanding and asked questions     GOALS   SHORT-TERM GOALS:  to be met in 6 weeks   X  MET  (Refer to diagram with measurements)  Patient will demonstrate a decrease in circumferential measurements of the affected extremity by 3-5 cm working towards the lymphedema swelling stabilizing and patient being able to get measured and fitted for a new compression garment to be worn daily to keep swelling down. X  ONGOING  Patient will demonstrate increased active range of motion by at least 10 degrees for head/neck working to increase safety during ambulation and future driving skills for looking at blind spot of the shoulder. X  ONGOING Patient/family/caregiver will demonstrate and verbalize 3-5 skin care precautionary measures to decrease dry skin and risk of infection. X  ONGOING Patient will demonstrate increased active shoulder/elbow/wrist range of motion by at least 20 degrees (left side) to be able to return to work and complete his daily duties safely. -- --      ASSESSMENT:  Assessment:  Patient progressing toward goal achievement.      Activity Tolerance:  Patient tolerance of  treatment: Fair (+)   Plan: Week of December 11 (2x)       Time In: 4823   Time Out: 1640   Timed Code Minutes: 55   Untimed Code Minutes: 0   Total Treatment Time: Ben Luo, P.T. #3506, [facility]   12/08/2017

## 2017-12-10 NOTE — PROGRESS NOTES
[facility] [address]  LYMPHEDEMA SERVICES  DAILY NOTE & PROGRESS NOTE    Date: 2017   Patient Name: Amish Rivers        CSN: [id number]   : 1980  (40 y.o.)  Gender: male   Referring Practitioner: Mir Russell MD ([address] [facility],)  Diagnosis: 1) Squamous Cell Cancer of Tongue  2) Lymphedema  Referral Date : 17    PT Visit Information  PT Insurance Information: [facility] ([address] [place] [address])  Total # of Visits to Date: 10  Plan of Care/Certification Expiration Date: 18  No Show: 0  Canceled Appointment: 0  Progress Note Counter: 10/10 (PROGRESS NOTE)    Restrictions/Precautions  Fall risk, Universal precautions,Trach and PEG tube in place. 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 The patient reported that since beginning the Complete Decongestive Therapy/Manual Lymph Drainage (CDT/MLD) treatments, he has noticed a decrease in the amount of swelling in his face and neck. \"I can definitely tell a difference, it doesn't feel as full\" per patient. OBJECTVE  Lymphedema Assessment:  Pitting Edema None   Skin Appearance/Condition Mild Redness-Neck   Skin Condition Dry (Mild): Thick scar/fibrotic tissue noted along the healed scars and in neck/jaw regions. Open Wound(s) No   Tissue Temperature Normal    Skin Folds None    Fibrotic Tissue Refer to skin condition above.      Orange Peel Texture None   Nailbed Therapy exercises (x 10 reps) dowel jenna shoulder flexion with light pressure applied at end range by the therapist with 5 second hold (patient reporting tightness/stretching in the left upper arm). The exercises were completed with compression bandages on, without complaints of pain from the patient. The Decongestive Therapy exercises assist with decreasing the swelling by increasing the muscle pumping action of the lymph collectors and by increasing the fluid uptake in the lymphatic system. Patient Education  Education Provided:  -11/30/2017: Reviewed goals and plan of care. Received signed referral from the patient's referral practitioner for Occupational Therapy evaluation and treat. Provided the patient with a copy of the referral and instructed the patient to contact OT for evaluation. Patient was in agreement. -11/27/2017: Reviewed goals, continue to recommend Occupational therapy for improved range of motion and strength for the left upper extremity. -11/17/2017: Reviewed plan of care. -11/10/2017: Refer to exercises above. -11/07/2017: Reviewed plan of care for exercises and discussed purpose for fabrication of padding for slight compression to create a micromassaging effect to the tissues to decrease swelling. -11/02/2017: Refer to Decongestive/Therapeutic Exercise above, with the introduction of shoulder range of motion and postural awareness exercises and instructed the patient on the use of the lumbar roll. Patient was in agreement and verbalized a good understanding.   -10/30/2017: Refer to Decongestive/Therapeutic Exercise; Proper deep breathing technique.   Learner:patient  Method: demonstration and explanation       Outcome: acknowledged understanding of goals, exercises, demonstrated understanding and asked questions     GOALS   SHORT-TERM GOALS:  to be met in 6 weeks   X  MET  (Refer to diagram with measurements)  Patient will demonstrate a decrease in circumferential measurements of the TREATMENT OF THIS PATIENT!     [address] [place] [address], P.T. #[id number], [facility]   11/30/2017

## 2017-12-12 NOTE — PROGRESS NOTES
treatments, he has noticed a decrease in the amount of swelling in his face and neck. \"I can definitely tell a difference, it doesn't feel as full\" per patient. TREATMENT SESSION  Manual Lymph Drainage (Purpose: Increase Lymph Circulation/Decongestion of head and neck area). -POSITION: Supine with head of treatment table slightly elevated with knees supported in slightly flexed position on bolster. -FREQUENCY: 5-7 circles completed unless otherwise stated. -SEQUENCING FOR NECK (POST-SURGICAL SWELLING)   -Effleurage (2-3x): From Sternum to Acromion (Bilaterally)   -\"Terminus\"-Supraclavicular Fossa (Manipulation of the Inferior Cervical Lymph Nodes (Stationary circles))   -\"Profundus\"-Manipulation of the Deep Lateral Cervical Lymph Nodes (Earlobe to Supraclavicular Fossa)   -Pre-Auricular LNN and Retro-Auricular LNN- (Stationary circles)   -Submandibular Lymph Nodes-Manipulation of the Superior Cervical Lymph Nodes (Chin to jaw angle)   -Shoulder Collectors (2 hand placement on the Acromion and Medial Shoulder)     -SEQUENCING FOR FACE (POST-SURGICAL SWELLING)   -Effleurage (2-3x): Along lower jaw, upper jaw, cheek, and the forehead in direction of the angle of the jaw. -Upper Jaw (Submandibular Lymph Nodes to the angle of the jaw)   -Bridge of nose (Bridge of nose to cheek)   -Cheek (Below Eyelids towards Submandibular Lymph Nodes then towards angle of Supraclavicular Fossa)   -Upper Eyelids and eyebrows (Working towards Preauricular Lymph Nodes)   -Forehead (Forehead and temple towards Preauricular Lymph Nodes)   -Top of head (Towards Preauricular Lymph Nodes)   -Effleurage  -REWORK  Fibrotic technique: Posterior neck and upper trapezius region (Supine position with bilateral upper extremities supported on tray table with pillow and elevated slightly to shoulder height.      Skin Care Measures  Applied moisturizer - Type: Eucerin Moisturizing creme on lateral trap muscles (bilaterally). Decongestive/Therapeutic Exercise  The patient was able to complete Decongestive Therapy exercises (x 10 reps) dowel jenna shoulder flexion with light pressure applied at end range by the therapist with 5 second hold (patient reporting tightness/stretching in the left upper arm); Shoulder adduction with dowel jenna; shoulder flexion to approximately 110 degrees angle with 3 pound weight; chest press and horizontal shoulder adduction/abduction. . The exercises were completed with compression bandages on, without complaints of pain from the patient. The Decongestive Therapy exercises assist with decreasing the swelling by increasing the muscle pumping action of the lymph collectors and by increasing the fluid uptake in the lymphatic system. Patient Education  Education Provided:  -11/30/2017: Reviewed goals and plan of care. Received signed referral from the patient's referral practitioner for Occupational Therapy evaluation and treat. Provided the patient with a copy of the referral and instructed the patient to contact OT for evaluation. Patient was in agreement. -11/27/2017: Reviewed goals, continue to recommend Occupational therapy for improved range of motion and strength for the left upper extremity. -11/17/2017: Reviewed plan of care. -11/10/2017: Refer to exercises above. -11/07/2017: Reviewed plan of care for exercises and discussed purpose for fabrication of padding for slight compression to create a micromassaging effect to the tissues to decrease swelling. -11/02/2017: Refer to Decongestive/Therapeutic Exercise above, with the introduction of shoulder range of motion and postural awareness exercises and instructed the patient on the use of the lumbar roll. Patient was in agreement and verbalized a good understanding.   -10/30/2017: Refer to Decongestive/Therapeutic Exercise; Proper deep breathing technique.   Learner:patient  Method: demonstration and explanation       Outcome:

## 2017-12-27 NOTE — PROGRESS NOTES
[facility]  LYMPHEDEMA SERVICES  DAILY NOTE     Date: 2017     Patient Name: Lisa Naranjo        CSN: [id number]   : 1980  (40 y.o.)  Gender: male   Referring Practitioner: Humberto Ag MD ([address],)  Diagnosis: 1) Squamous Cell Cancer of Tongue  2) Lymphedema  Referral Date : 17    PT Visit Information  PT Insurance Information: [facility] ([address] [place] [address])  Total # of Visits to Date: 15  Plan of Care/Certification Expiration Date: 18  No Show: 0  Canceled Appointment: 0  Progress Note Counter:     Restrictions/Precautions  Fall risk, Universal precautions,Trach and PEG tube in place. Pain  Patient Currently in Pain: Yes (Patient denied pain in the face/neck region, he reported numbness on the lateral aspect of the chin/neck region.)  Pain Assessment  Pain Assessment: 0-10  Pain Level: 3  Pain Type: Chronic pain  Pain Location: Neck  Pain Orientation: Left, Outer  Pain Descriptors: Throbbing, Aching    SUBJECTIVE     -2017: The patient presented to the Lymphedema clinic on this date with reports of pain in the lateral aspect of the neck (just below the left ear). He stated \"I'm not sure if it's because it's so cold outside or what\" per patient. The therapist recommended that the patient wear a scarf around her neck and ears to block the cold weather and wind. The therapist also discussed the recommendation for the patient to undergo the Occupational Therapy evaluation to assess full function of the left upper extremity. -2017: The patient presented to the Lymphedema clinic on this date with no new complaints. -2017: The patient presented to the Lymphedema clinic on this date with no new complaints. He reported, \"I feel like I've been able to do more with my left arm, it doesn't feel as tight\" per patient.    -2017: The patient presented to the Lymphedema clinic with the Trach tube removed OT for evaluation. Patient was in agreement. -11/27/2017: Reviewed goals, continue to recommend Occupational therapy for improved range of motion and strength for the left upper extremity. -11/17/2017: Reviewed plan of care. -11/10/2017: Refer to exercises above. -11/07/2017: Reviewed plan of care for exercises and discussed purpose for fabrication of padding for slight compression to create a micromassaging effect to the tissues to decrease swelling. -11/02/2017: Refer to Decongestive/Therapeutic Exercise above, with the introduction of shoulder range of motion and postural awareness exercises and instructed the patient on the use of the lumbar roll. Patient was in agreement and verbalized a good understanding.   -10/30/2017: Refer to Decongestive/Therapeutic Exercise; Proper deep breathing technique. Learner:patient  Method: demonstration and explanation       Outcome: acknowledged understanding of goals, exercises, demonstrated understanding and asked questions     GOALS   SHORT-TERM GOALS:  to be met in 6 weeks   X  MET  (Refer to diagram with measurements)  Patient will demonstrate a decrease in circumferential measurements of the affected extremity by 3-5 cm working towards the lymphedema swelling stabilizing and patient being able to get measured and fitted for a new compression garment to be worn daily to keep swelling down. X  ONGOING  Patient will demonstrate increased active range of motion by at least 10 degrees for head/neck working to increase safety during ambulation and future driving skills for looking at blind spot of the shoulder. X  ONGOING Patient/family/caregiver will demonstrate and verbalize 3-5 skin care precautionary measures to decrease dry skin and risk of infection. X  ONGOING Patient will demonstrate increased active shoulder/elbow/wrist range of motion by at least 20 degrees (left side) to be able to return to work and complete his daily duties safely.    -- -- ASSESSMENT:  Assessment:  Patient progressing toward goal achievement.      Activity Tolerance:  Patient tolerance of  treatment: Fair (+)   Plan: Week of January 8 (2x)       Time In: 4285   Time Out: 1655   Timed Code Minutes: 70   Untimed Code Minutes: 0   Total Treatment Time: [address], [address]9019, [place], LULU  12/27/2017

## 2017-12-27 NOTE — PROGRESS NOTES
moisturizer - Type: Eucerin Moisturizing creme on lateral trap muscles (bilaterally). Decongestive/Therapeutic Exercise  The patient was able to complete Decongestive Therapy exercises (x 10 reps) dowel jenna shoulder flexion with light pressure applied at end range by the therapist with 5 second hold (patient reporting tightness/stretching in the left upper arm); Shoulder adduction with dowel jenna; shoulder flexion to approximately 110 degrees angle with 3 pound weight; chest press and horizontal shoulder adduction/abduction. . The exercises were completed with compression bandages on, without complaints of pain from the patient. The Decongestive Therapy exercises assist with decreasing the swelling by increasing the muscle pumping action of the lymph collectors and by increasing the fluid uptake in the lymphatic system. Patient Education  Education Provided:  12/21/2017: Discussed plan of care. -11/30/2017: Reviewed goals and plan of care. Received signed referral from the patient's referral practitioner for Occupational Therapy evaluation and treat. Provided the patient with a copy of the referral and instructed the patient to contact OT for evaluation. Patient was in agreement. -11/27/2017: Reviewed goals, continue to recommend Occupational therapy for improved range of motion and strength for the left upper extremity. -11/17/2017: Reviewed plan of care. -11/10/2017: Refer to exercises above. -11/07/2017: Reviewed plan of care for exercises and discussed purpose for fabrication of padding for slight compression to create a micromassaging effect to the tissues to decrease swelling. -11/02/2017: Refer to Decongestive/Therapeutic Exercise above, with the introduction of shoulder range of motion and postural awareness exercises and instructed the patient on the use of the lumbar roll.  Patient was in agreement and verbalized a good understanding.   -10/30/2017: Refer to Decongestive/Therapeutic Exercise;

## 2018-01-01 ENCOUNTER — HOSPITAL ENCOUNTER (OUTPATIENT)
Dept: PHYSICAL THERAPY | Age: 38
Setting detail: THERAPIES SERIES
Discharge: HOME | End: 2018-01-11
Payer: COMMERCIAL

## 2018-01-01 ENCOUNTER — HOSPITAL ENCOUNTER (OUTPATIENT)
Dept: PHYSICAL THERAPY | Age: 38
Setting detail: THERAPIES SERIES
Discharge: HOME | End: 2018-02-27
Payer: COMMERCIAL

## 2018-01-01 ENCOUNTER — APPOINTMENT (OUTPATIENT)
Dept: PHYSICAL THERAPY | Age: 38
End: 2018-01-01
Payer: COMMERCIAL

## 2018-01-01 ENCOUNTER — OFFICE VISIT (OUTPATIENT)
Dept: FAMILY MEDICINE CLINIC | Age: 38
End: 2018-01-01
Payer: COMMERCIAL

## 2018-01-01 ENCOUNTER — HOSPITAL ENCOUNTER (OUTPATIENT)
Dept: PHYSICAL THERAPY | Age: 38
Setting detail: THERAPIES SERIES
Discharge: HOME | End: 2018-01-09
Payer: COMMERCIAL

## 2018-01-01 ENCOUNTER — HOSPITAL ENCOUNTER (OUTPATIENT)
Dept: PHYSICAL THERAPY | Age: 38
Setting detail: THERAPIES SERIES
Discharge: HOME | End: 2018-01-17
Payer: COMMERCIAL

## 2018-01-01 ENCOUNTER — HOSPITAL ENCOUNTER (OUTPATIENT)
Dept: PHYSICAL THERAPY | Age: 38
Setting detail: THERAPIES SERIES
Discharge: HOME | End: 2018-02-13
Payer: COMMERCIAL

## 2018-01-01 VITALS
HEIGHT: 74 IN | SYSTOLIC BLOOD PRESSURE: 110 MMHG | BODY MASS INDEX: 30.42 KG/M2 | HEART RATE: 62 BPM | WEIGHT: 237 LBS | DIASTOLIC BLOOD PRESSURE: 80 MMHG

## 2018-01-01 VITALS
HEART RATE: 66 BPM | HEIGHT: 74 IN | SYSTOLIC BLOOD PRESSURE: 110 MMHG | BODY MASS INDEX: 31.32 KG/M2 | WEIGHT: 244 LBS | DIASTOLIC BLOOD PRESSURE: 80 MMHG

## 2018-01-01 VITALS
DIASTOLIC BLOOD PRESSURE: 80 MMHG | BODY MASS INDEX: 29.07 KG/M2 | SYSTOLIC BLOOD PRESSURE: 138 MMHG | WEIGHT: 226.4 LBS | HEART RATE: 96 BPM

## 2018-01-01 DIAGNOSIS — M54.41 ACUTE BILATERAL LOW BACK PAIN WITH BILATERAL SCIATICA: ICD-10-CM

## 2018-01-01 DIAGNOSIS — I10 ESSENTIAL HYPERTENSION: ICD-10-CM

## 2018-01-01 DIAGNOSIS — C02.9 TONGUE CANCER (HCC): ICD-10-CM

## 2018-01-01 DIAGNOSIS — M54.42 ACUTE BILATERAL LOW BACK PAIN WITH BILATERAL SCIATICA: ICD-10-CM

## 2018-01-01 DIAGNOSIS — M54.41 ACUTE BILATERAL LOW BACK PAIN WITH BILATERAL SCIATICA: Primary | ICD-10-CM

## 2018-01-01 DIAGNOSIS — M54.42 ACUTE BILATERAL LOW BACK PAIN WITH BILATERAL SCIATICA: Primary | ICD-10-CM

## 2018-01-01 DIAGNOSIS — C78.02 MALIGNANT NEOPLASM METASTATIC TO LEFT LUNG (HCC): ICD-10-CM

## 2018-01-01 PROCEDURE — 99213 OFFICE O/P EST LOW 20 MIN: CPT | Performed by: FAMILY MEDICINE

## 2018-01-01 PROCEDURE — 97140 MANUAL THERAPY 1/> REGIONS: CPT

## 2018-01-01 PROCEDURE — 99214 OFFICE O/P EST MOD 30 MIN: CPT | Performed by: FAMILY MEDICINE

## 2018-01-01 RX ORDER — PREDNISONE 20 MG/1
40 TABLET ORAL DAILY
Qty: 10 TABLET | Refills: 0 | Status: SHIPPED | OUTPATIENT
Start: 2018-01-01 | End: 2018-01-01 | Stop reason: SDUPTHER

## 2018-01-01 RX ORDER — AMLODIPINE BESYLATE 10 MG/1
10 TABLET ORAL DAILY
Qty: 90 TABLET | Refills: 1 | Status: SHIPPED | OUTPATIENT
Start: 2018-01-01 | End: 2018-01-01 | Stop reason: SDUPTHER

## 2018-01-01 RX ORDER — PREDNISONE 20 MG/1
40 TABLET ORAL DAILY
Qty: 10 TABLET | Refills: 0 | Status: SHIPPED | OUTPATIENT
Start: 2018-01-01

## 2018-01-01 RX ORDER — SODIUM FLUORIDE 5 MG/G
GEL, DENTIFRICE DENTAL
COMMUNITY

## 2018-01-01 RX ORDER — AMLODIPINE BESYLATE 10 MG/1
10 TABLET ORAL DAILY
Qty: 90 TABLET | Refills: 1 | Status: SHIPPED | OUTPATIENT
Start: 2018-01-01

## 2018-01-01 ASSESSMENT — ENCOUNTER SYMPTOMS
CHEST TIGHTNESS: 0
CHEST TIGHTNESS: 0
ABDOMINAL PAIN: 0
SORE THROAT: 0
VOMITING: 0
BOWEL INCONTINENCE: 0
EYE DISCHARGE: 0
COLOR CHANGE: 0
VOMITING: 0
COLOR CHANGE: 0
NAUSEA: 0
TROUBLE SWALLOWING: 0
BACK PAIN: 1
EYE REDNESS: 0
SHORTNESS OF BREATH: 0
COUGH: 0
NAUSEA: 0
SHORTNESS OF BREATH: 0

## 2018-01-01 ASSESSMENT — PATIENT HEALTH QUESTIONNAIRE - PHQ9
2. FEELING DOWN, DEPRESSED OR HOPELESS: 0
1. LITTLE INTEREST OR PLEASURE IN DOING THINGS: 0
SUM OF ALL RESPONSES TO PHQ QUESTIONS 1-9: 0
SUM OF ALL RESPONSES TO PHQ9 QUESTIONS 1 & 2: 0

## 2018-01-09 NOTE — PROGRESS NOTES
[facility]  LYMPHEDEMA SERVICES  DAILY NOTE     Date: 2018     Patient Name: Justino Farnsworth        CSN: [id number]   : 1980  (40 y.o.)  Gender: male   Referring Practitioner: DR. Dora Khalil MD ([address] [place] [address],)  Diagnosis: 1) Squamous Cell Cancer of Tongue  2) Lymphedema  Referral Date : 17    PT Visit Information  PT Insurance Information: [facility] ([address] [place] [address])  Total # of Visits to Date: 13  Plan of Care/Certification Expiration Date: 18  No Show: 0  Canceled Appointment: 0  Progress Note Counter: 15/20    Restrictions/Precautions  Fall risk, Universal precautions,Trach and PEG tube in place. Pain  Patient Currently in Pain: Denies (Patient denied pain in the face/neck region, he reported numbness on the lateral aspect of the chin/neck region.)    SUBJECTIVE     -2018: The patient presented to the Lymphedema clinic on this date and reported that his has follow up appointments including: Family physician on ; Surgeon on ; CT Scan and radiation/chemotherapy team on . All appointments for follow up. TREATMENT SESSION  Manual Lymph Drainage (Purpose: Increase Lymph Circulation/Decongestion of head and neck area). -POSITION: Supine with head of treatment table slightly elevated with knees supported in slightly flexed position on bolster. -FREQUENCY: 5-7 circles completed unless otherwise stated.   -SEQUENCING FOR NECK (POST-SURGICAL SWELLING)   -Effleurage (2-3x): From Sternum to Acromion (Bilaterally)   -\"Terminus\"-Supraclavicular Fossa (Manipulation of the Inferior Cervical Lymph Nodes (Stationary circles))   -\"Profundus\"-Manipulation of the Deep Lateral Cervical Lymph Nodes (Earlobe to Supraclavicular Fossa)   -Pre-Auricular LNN and Retro-Auricular LNN- (Stationary circles)   -Submandibular Lymph Nodes-Manipulation of the Superior Cervical Lymph Nodes (Chin to jaw angle)   -Shoulder

## 2018-01-12 NOTE — PROGRESS NOTES
[address] [place]  [address] [facility] [facility], [place]  Phone:  [phone]  Fax:  [phone]       Name: Cayla Salinas  : 1980    Chief Complaint   Patient presents with    6 Month Follow-Up    Hypertension       HPI:     Cayla Salinas is a 40 y.o. male who presents today for follow-up of hypertension. He has tongue cancer and is s/p partial tongue resection. He has completed his chemotherapy and radiation. His trach has been removed and he hasn't used his feeding tube in weeks. Hypertension   This is a chronic problem. The current episode started more than 1 year ago. The problem is unchanged. The problem is controlled. Pertinent negatives include no anxiety, chest pain, headaches, palpitations, peripheral edema or shortness of breath. There are no associated agents to hypertension. Risk factors for coronary artery disease include male gender and obesity. Past treatments include calcium channel blockers and beta blockers. The current treatment provides moderate improvement. There are no compliance problems. Current Outpatient Prescriptions:     metoprolol tartrate (LOPRESSOR) 25 MG tablet, Take 1 tablet by mouth 2 times daily, Disp: 60 tablet, Rfl: 3    amLODIPine (NORVASC) 10 MG tablet, Take 1 tablet by mouth daily, Disp: 90 tablet, Rfl: 1    Naproxen Sodium (ALEVE) 220 MG CAPS, Take 1 tablet by mouth 2 times daily as needed for Pain, Disp: , Rfl:     No Known Allergies    Subjective:      Review of Systems   Constitutional: Negative for chills and fever. Respiratory: Negative for chest tightness and shortness of breath. Cardiovascular: Negative for chest pain and palpitations. Gastrointestinal: Negative for nausea and vomiting. Neurological: Negative for dizziness and headaches.        Objective:     /80 (Site: Left Arm, Position: Sitting, Cuff Size: Large Adult)   Pulse 62   Ht 6' 2\" (1.88 m)   Wt 237 lb (107.5 kg)   BMI 30.43 kg/m²

## 2018-01-12 NOTE — PROGRESS NOTES
[facility]  LYMPHEDEMA SERVICES  DAILY NOTE     Date: 2018     Patient Name: Francoise Muñoz        CSN: [id number]   : 1980  (40 y.o.)  Gender: male   Referring Practitioner: DR. Natan Griggs MD ([address] [place] [address],)  Diagnosis: 1) Squamous Cell Cancer of Tongue  2) Lymphedema  Referral Date : 17    PT Visit Information  PT Insurance Information: [facility] ([address] [place] [address])  Total # of Visits to Date: 12  Plan of Care/Certification Expiration Date: 18  No Show: 0  Canceled Appointment: 0  Progress Note Counter:     Restrictions/Precautions  Fall risk, Universal precautions,Trach and PEG tube in place. Pain  Patient Currently in Pain: Denies (Patient denied pain in the face/neck region, he reported numbness on the lateral aspect of the chin/neck region.)    SUBJECTIVE     -2018: The patient presented to the Lymphedema clinic on this date with no new reports of changes in medical status. -2018: The patient presented to the Lymphedema clinic on this date and reported that his has follow up appointments including: Family physician on ; Surgeon on ; CT Scan and radiation/chemotherapy team on . All appointments for follow up. TREATMENT SESSION  Manual Lymph Drainage (Purpose: Increase Lymph Circulation/Decongestion of head and neck area). -POSITION: Supine with head of treatment table slightly elevated with knees supported in slightly flexed position on bolster. -FREQUENCY: 5-7 circles completed unless otherwise stated.   -SEQUENCING FOR NECK (POST-SURGICAL SWELLING)   -Effleurage (2-3x): From Sternum to Acromion (Bilaterally)   -\"Terminus\"-Supraclavicular Fossa (Manipulation of the Inferior Cervical Lymph Nodes (Stationary circles))   -\"Profundus\"-Manipulation of the Deep Lateral Cervical Lymph Nodes (Earlobe to Supraclavicular Fossa)   -Pre-Auricular LNN and Retro-Auricular LNN- (Stationary circles)   -Submandibular Lymph Nodes-Manipulation of the Superior Cervical Lymph Nodes (Chin to jaw angle)   -Shoulder Collectors (2 hand placement on the Acromion and Medial Shoulder)     -SEQUENCING FOR FACE (POST-SURGICAL SWELLING)   -Effleurage (2-3x): Along lower jaw, upper jaw, cheek, and the forehead in direction of the angle of the jaw. -Upper Jaw (Submandibular Lymph Nodes to the angle of the jaw)   -Bridge of nose (Bridge of nose to cheek)   -Cheek (Below Eyelids towards Submandibular Lymph Nodes then towards angle of Supraclavicular Fossa)   -Upper Eyelids and eyebrows (Working towards Preauricular Lymph Nodes)   -Forehead (Forehead and temple towards Preauricular Lymph Nodes)   -Top of head (Towards Preauricular Lymph Nodes)   -Effleurage  -REWORK  Fibrotic technique: Posterior neck and upper trapezius region (Seated position with bilateral upper extremities supported on tray table with pillow and elevated slightly to shoulder height. Skin Care Measures  Applied moisturizer - Type: Eucerin Moisturizing creme on lateral trap muscles (bilaterally). Decongestive Therapy Exercises  The patient was able to complete Decongestive Therapy exercises (x 10 reps) including fist clenches; (x 8 reps) dowel jenna shoulder flexion and chest presses with light resistance at end-range by therapist; Tricep curls with 3 pound weights; horizontal shoulder abduction/adduction with 3 pound weights. Noted slight tremors (weakness during exercises). The exercises were completed with compression bandages on, without complaints of pain from the patient. The Decongestive Therapy exercises assist with decreasing the swelling by increasing the muscle pumping action of the lymph collectors and by increasing the fluid uptake in the lymphatic system. Patient Education  Education Provided:  -01/11/2018: Reviewed fibrotic technique especially on the left side of his neck and range of motion.   -01/09/2018: Demonstrated proper increased active range of motion by at least 10 degrees for head/neck working to increase safety during ambulation and future driving skills for looking at blind spot of the shoulder. X  ONGOING Patient/family/caregiver will demonstrate and verbalize 3-5 skin care precautionary measures to decrease dry skin and risk of infection. X  ONGOING Patient will demonstrate increased active shoulder/elbow/wrist range of motion by at least 20 degrees (left side) to be able to return to work and complete his daily duties safely. -- --      ASSESSMENT:  Assessment:  Patient progressing toward goal achievement.      Activity Tolerance:  Patient tolerance of  treatment: Fair (+)   Plan: Week of January 15 (1x)       Time In: 1515   Time Out: 1620   Timed Code Minutes: 65   Untimed Code Minutes: 0   Total Treatment Time: [address], P[address] #8974, [place], LULU  1/11/2018

## 2018-01-17 NOTE — PROGRESS NOTES
I certify that I have examined the patient below and determined that Physical Medicine and Rehabilitation service is necessary; that the secondary diagnosis for the provision of rehabilitation services is consistent with identified needs; that service will be furnished on an outpatient basis while the patient is in my care; that I approve the above plan of care for up to 90 days or as specifically noted above and will review it within that time frame or more often if the patients condition requires. Attestation, signature or co-signature of physician indicates approval of certification requirements.    ________________________ ____________ __________  Physician Signature   Date   Time    [facility]  LYMPHEDEMA SERVICES  DAILY NOTE & PROGRESS NOTE    Date: 2018     Patient Name: Cayla Salinas        CSN: [id number]   : 1980  (40 y.o.)  Gender: male   Referring Practitioner: DR. Stephen Ahumada MD ([address] [place] [address])  Diagnosis: 1) Squamous Cell Cancer of Tongue  2) Lymphedema  Referral Date : 17    PT Visit Information  PT Insurance Information: [facility] ([address] [place] [address])  Total # of Visits to Date: 16  Plan of Care/Certification Expiration Date: 18  No Show: 0  Canceled Appointment: 0  Progress Note Counter:     Restrictions/Precautions  Fall risk, Universal precautions,Trach and PEG tube in place. 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  The patient has verbalized being pleased with the treatment progress, \"I can tell the difference, my face and neck don't feel as puffy\" per patient. The patient has returned to work at the hospital.     [address] (Measurements in cm)                        TOTAL   1a) Right Tragus to Chin 15.4   2a) Right Tragus to Mouth Corner 11.3   3a) Right Mandibular Angle to Nasal Wing 12.5   4a) Right Mandibular Angle to Inner Eye Corner 13.2   5a) Right Mandibular Angle to Outer Eye Corner 9.5   6a) Right Chin to [facility] 11.0   7a)  Right Mandibular Angle to Chin 12.7   1b) Left Tragus to Chin 15.7   2b) Left Tragus to Mouth Corner 11.4   3b) Left Mandibular Angle to Nasal Wing 11.3   4b) Left Mandibular Angle to Inner Eye Corner 12.7   5b) Left Mandibular Angle to Outer Eye Corner 9.7   6b) Left Chin to [facility] 10.5   7b) Left Mandibular Angle to Chin 13.0   TOTAL FACIAL COMPOSITE 169.9 cm       NECK MEASUREMENTS (Measurements in cm)                     TOTAL   1) Superior Neck Angle 49.2   2) Middle Neck Angle 45.5   3) Inferior Neck Angle 42.0   TOTAL NECK COMPOSITE 136.7 cm         TREATMENT SESSION  Manual Lymph Drainage (Purpose: Increase Lymph Circulation/Decongestion of head and neck area). -POSITION: Supine with head of treatment table slightly elevated with knees supported in slightly flexed position on bolster. -FREQUENCY: 5-7 circles completed unless otherwise stated.   -SEQUENCING FOR NECK (POST-SURGICAL SWELLING)   -Effleurage (2-3x): From Sternum to Acromion (Bilaterally)   -\"Terminus\"-Supraclavicular Fossa (Manipulation of the Inferior Cervical Lymph Nodes (Stationary circles))   -\"Profundus\"-Manipulation of the Deep Lateral Cervical Lymph Nodes (Earlobe to Supraclavicular Fossa)   -Pre-Auricular LNN and Retro-Auricular LNN- (Stationary circles)   -Submandibular Lymph Nodes-Manipulation of the Superior Cervical Lymph Nodes (Chin to jaw angle)   -Shoulder Collectors (2 hand

## 2018-02-13 NOTE — PROGRESS NOTES
straherson  -PATIENT TOLERANCE: Good (Patient denied pain while wearing the compression garment during the treatment session.)  -DURATION: 10 minutes prior to MLD treatment and 10 minutes following MLD treatment. Skin Care Measures  Applied moisturizer - Type: Eucerin Moisturizing creme on lateral trap muscles (bilaterally). Patient Education  Education Provided:  -02/13/2018: Reviewed goals, plan of care and purpose/wearing schedule for the compression garment.   -01/17/2018: Reviewed goals and plan of care.  -01/11/2018: Reviewed fibrotic technique especially on the left side of his neck and range of motion. -01/09/2018: Demonstrated proper Fibrotic Technique with moderate pressure applied along side of the neck, mid-chin and around old  trach site. -12/27/2017: Refer to Subjective information above. Reviewed neck active range of motion (lateral flexion, rotation, flexion, extension). 12/21/2017: Discussed plan of care. -11/30/2017: Reviewed goals and plan of care. Received signed referral from the patient's referral practitioner for Occupational Therapy evaluation and treat. Provided the patient with a copy of the referral and instructed the patient to contact OT for evaluation. Patient was in agreement. -11/27/2017: Reviewed goals, continue to recommend Occupational therapy for improved range of motion and strength for the left upper extremity. -11/17/2017: Reviewed plan of care. -11/10/2017: Refer to exercises above. -11/07/2017: Reviewed plan of care for exercises and discussed purpose for fabrication of padding for slight compression to create a micromassaging effect to the tissues to decrease swelling. -11/02/2017: Refer to Decongestive/Therapeutic Exercise above, with the introduction of shoulder range of motion and postural awareness exercises and instructed the patient on the use of the lumbar roll.  Patient was in agreement and verbalized a good understanding.   -10/30/2017: Refer to

## 2018-03-04 NOTE — PROGRESS NOTES
[facility]  LYMPHEDEMA SERVICES  DAILY NOTE & DISCHARGE NOTE    Date: 2018    Patient Name: Sang Mayorga        CSN: [id number]   : 1980  (40 y.o.)  Gender: male   Referring Practitioner: Sylvia Urbina MD ([address],)  Diagnosis: 1) Squamous Cell Cancer of Tongue  2) Lymphedema  Referral Date : 17    PT Visit Information  PT Insurance Information: [facility] ([address] [place] [address])  Total # of Visits to Date: 21  Plan of Care/Certification Expiration Date: 18  No Show: 0  Canceled Appointment: 0  Progress Note Counter:     Restrictions/Precautions  Fall risk, Universal precautions,Trach and PEG tube in place. 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 He also presented with decreased active/passive range of motion of the left shoulder, elbow, wrist and decreased functional mobility. He has undergone Complete Decongestive Therapy/Manual Lymph Drainage (CDT/MLD) treatments and has verbalized being pleased with the treatment results. \"I can tell a difference especially with the massage and the compression together\" per patient. OBJECTIVE  Lymphedema Assessment:  Pitting Edema None   Skin Appearance/Condition Mild Redness-Skin color at the neck (radiation location) is within approximately 25% in comparison to the patient's normal surrounding skin color. Decreased redness in size and intensity.    Skin Condition Normal   Open Wound(s) No   Tissue Temperature Normal   Skin Folds Slight bulging noted at the right (Bilaterally)   -\"Terminus\"-Supraclavicular Fossa (Manipulation of the Inferior Cervical Lymph Nodes (Stationary circles))   -\"Profundus\"-Manipulation of the Deep Lateral Cervical Lymph Nodes (E[place] to Supraclavicular Fossa)   -Pre-Auricular LNN and Retro-Auricular LNN- (Stationary circles)   -Submandibular Lymph Nodes-Manipulation of the Superior Cervical Lymph Nodes (Chin to jaw angle)   -Shoulder Collectors (2 hand placement on the Acromion and Medial Shoulder)     -SEQUENCING FOR FACE (POST-SURGICAL SWELLING)   -Effleurage (2-3x): Along lower jaw, upper jaw, cheek, and the forehead in direction of the angle of the jaw. -Upper Jaw (Submandibular Lymph Nodes to the angle of the jaw)   -Bridge of nose (Bridge of nose to cheek)   -Cheek (Below Eyelids towards Submandibular Lymph Nodes then towards angle of Supraclavicular Fossa)   -Upper Eyelids and eyebrows (Working towards Preauricular Lymph Nodes)   -Forehead (Forehead and temple towards Preauricular Lymph Nodes)   -Top of head (Towards Preauricular Lymph Nodes)   -Effleurage  -REWORK    Patient Education  Education Provided:  -02/27/2018: Reviewed self-MLD, compression garment wearing schedule, exercises. -02/13/2018: Reviewed goals, plan of care and purpose/wearing schedule for the compression garment.   -01/17/2018: Reviewed goals and plan of care.  -01/11/2018: Reviewed fibrotic technique especially on the left side of his neck and range of motion. -01/09/2018: Demonstrated proper Fibrotic Technique with moderate pressure applied along side of the neck, mid-chin and around old  trach site. -12/27/2017: Refer to Subjective information above. Reviewed neck active range of motion (lateral flexion, rotation, flexion, extension). 12/21/2017: Discussed plan of care. -11/30/2017: Reviewed goals and plan of care. Received signed referral from the patient's referral practitioner for Occupational Therapy evaluation and treat.  Provided the patient with a work and complete his daily duties safely. LONG TERM GOALS -ALL LONG TERM GOALS ARE MET. ASSESSMENT:  Assessment:    -The patient is a very pleasant 40year old male who has undergone Complete Decongestive Therapy/Manual Lymph Drainage (CDT/MLD) treatments, and made good progress as noted by the measurements reported above. The patient has demonstrated increased cervical and upper extremity active range of motion that has allowed the patient to demonstrate improved functional mobility as noted by the patient being able to return to his full time job without restrictions, the patient being able to complete his activities of daily living independently and being able to move from his mother's home and return to living alone, patient is driving and has denied pain. Activity Tolerance:  Patient tolerance of  treatment: Good  Plan: The patient is being discharged from the Lymphedema clinic on this date with a home management program that includes: self-MLD, skin care, wearing compression garment daily, and exercises. Time In: 1530   Time Out: 1645   Timed Code Minutes: 75   Untimed Code Minutes: 0   Total Treatment Time: 76     [place] [address] DR. Edna Odom MD FOR ALLOWING US TO ASSIST IN THE CARE AND TREATMENT OF THIS PATIENT!     Sheela Rashid, P.T. #[id number], LULU SAENZ     2/27/2018